# Patient Record
Sex: FEMALE | Race: BLACK OR AFRICAN AMERICAN | Employment: OTHER | URBAN - METROPOLITAN AREA
[De-identification: names, ages, dates, MRNs, and addresses within clinical notes are randomized per-mention and may not be internally consistent; named-entity substitution may affect disease eponyms.]

---

## 2018-09-16 ENCOUNTER — HOSPITAL ENCOUNTER (EMERGENCY)
Age: 73
Discharge: HOME | End: 2018-09-16
Attending: EMERGENCY MEDICINE
Payer: MEDICARE

## 2018-09-16 VITALS
OXYGEN SATURATION: 97 % | WEIGHT: 290 LBS | RESPIRATION RATE: 16 BRPM | HEIGHT: 64 IN | HEART RATE: 88 BPM | DIASTOLIC BLOOD PRESSURE: 91 MMHG | SYSTOLIC BLOOD PRESSURE: 159 MMHG | BODY MASS INDEX: 49.51 KG/M2 | TEMPERATURE: 98 F

## 2018-09-16 DIAGNOSIS — N89.8 VAGINAL LESION: ICD-10-CM

## 2018-09-16 DIAGNOSIS — K64.4 EXTERNAL HEMORRHOID: Primary | ICD-10-CM

## 2018-09-16 LAB
ERYTHROCYTE [DISTWIDTH] IN BLOOD BY AUTOMATED COUNT: 17.3 %
HCT VFR BLD AUTO: 36.5 % (ref 35.8–46.3)
HGB BLD-MCNC: 11.1 G/DL (ref 11.7–15.4)
MCH RBC QN AUTO: 26.1 PG (ref 26.1–32.9)
MCHC RBC AUTO-ENTMCNC: 30.4 G/DL (ref 31.4–35)
MCV RBC AUTO: 85.9 FL (ref 79.6–97.8)
NRBC # BLD: 0 K/UL (ref 0–0.2)
PLATELET # BLD AUTO: 323 K/UL (ref 150–450)
PMV BLD AUTO: 9.2 FL (ref 9.4–12.3)
RBC # BLD AUTO: 4.25 M/UL (ref 4.05–5.2)
WBC # BLD AUTO: 9 K/UL (ref 4.3–11.1)

## 2018-09-16 PROCEDURE — 77030011943

## 2018-09-16 PROCEDURE — 51701 INSERT BLADDER CATHETER: CPT | Performed by: EMERGENCY MEDICINE

## 2018-09-16 PROCEDURE — 99283 EMERGENCY DEPT VISIT LOW MDM: CPT | Performed by: EMERGENCY MEDICINE

## 2018-09-16 PROCEDURE — 81003 URINALYSIS AUTO W/O SCOPE: CPT | Performed by: EMERGENCY MEDICINE

## 2018-09-16 PROCEDURE — 85027 COMPLETE CBC AUTOMATED: CPT

## 2018-09-16 RX ORDER — DOCUSATE SODIUM 100 MG/1
100 CAPSULE, LIQUID FILLED ORAL 2 TIMES DAILY
Qty: 10 CAP | Refills: 0 | Status: SHIPPED | OUTPATIENT
Start: 2018-09-16 | End: 2018-09-21

## 2018-09-16 RX ORDER — HYDROCORTISONE 25 MG/G
CREAM TOPICAL 4 TIMES DAILY
Qty: 30 G | Refills: 0 | Status: SHIPPED | OUTPATIENT
Start: 2018-09-16

## 2018-09-16 NOTE — ED PROVIDER NOTES
HPI Comments: Blood noted on toilet paper when she wipes after urinating. Patient has not seen blood in her stool. She is unsure if it is urinary source or vaginal source. She has had a hysterectomy in the past.   Patient has not been sexually active for many many years. Patient is a 72 y.o. female presenting with vaginal bleeding. The history is provided by the patient. Vaginal Bleeding This is a new problem. The current episode started yesterday. Episode frequency: intermittent. The problem has not changed since onset. Pertinent negatives include no abdominal pain. Exacerbated by: with urination. Nothing relieves the symptoms. No past medical history on file. No past surgical history on file. No family history on file. Social History Social History  Marital status: SINGLE Spouse name: N/A  
 Number of children: N/A  
 Years of education: N/A Occupational History  Not on file. Social History Main Topics  Smoking status: Not on file  Smokeless tobacco: Not on file  Alcohol use Not on file  Drug use: Not on file  Sexual activity: Not on file Other Topics Concern  Not on file Social History Narrative ALLERGIES: Review of patient's allergies indicates no known allergies. Review of Systems Constitutional: Negative for fever. Gastrointestinal: Negative for abdominal pain, blood in stool, diarrhea, nausea and vomiting. Genitourinary: Positive for vaginal bleeding. Negative for dysuria, frequency, urgency and vaginal pain. Neurological: Negative for light-headedness. Vitals:  
 09/16/18 1318 BP: (!) 159/91 Pulse: 88 Resp: 16 Temp: 98 °F (36.7 °C) SpO2: 97% Weight: 131.5 kg (290 lb) Height: 5' 4\" (1.626 m) Physical Exam  
Constitutional: She is oriented to person, place, and time. She appears well-developed and well-nourished. No distress. Very pleasant, morbidly obese HENT:  
 Mouth/Throat: Oropharynx is clear and moist.  
Eyes: Conjunctivae are normal.  
Cardiovascular: Normal rate, regular rhythm and normal heart sounds. Pulmonary/Chest: Effort normal and breath sounds normal.  
Abdominal: Soft. She exhibits no distension. There is no tenderness. There is no rebound and no guarding. Genitourinary: Rectal exam shows guaiac negative stool. There is no lesion or injury on the right labia. There is no lesion or injury on the left labia. There is tenderness (there is an area in theleft anterolateral vaginal wall that appears hypertrophic and irritated. No active bleeding. Not completely consistent with an obvious polyp but does appear abnormal) in the vagina. No bleeding in the vagina. No vaginal discharge found. Musculoskeletal: Normal range of motion. She exhibits no edema. Neurological: She is alert and oriented to person, place, and time. Skin: Skin is warm and dry. Nursing note and vitals reviewed. MDM Number of Diagnoses or Management Options External hemorrhoid:  
Vaginal lesion:  
Diagnosis management comments: Straight catheter to obtain urine to see if the source is urine. Rectal exam to exclude GI bleeding. Possible speculum exam if blood seems to be coming from the vagina. Check hemoglobin. 3:02 PM 
Patient has a hemorrhoid that was tender and non-thrombosed but I did not see any bleeding. Rectal exam was heme-negative. Vaginal exam revealed a lesion in the anterolateral wall without active bleeding. Serial did not seem consistent with an obvious polyp but it does need to be followed up and rechecked. Urine was negative for blood as well. Likely blood from hemorrhoid versus the vaginal lesion. Patient be discharged home as long his hemoglobin is stable. Follow-up in [place] when she returns home for recheck vaginal speculum exam was recommended. Treat hemorrhoid. Amount and/or Complexity of Data Reviewed Clinical lab tests: ordered and reviewed (Results for orders placed or performed during the hospital encounter of 09/16/18 
-CBC W/O DIFF Result                                            Value                         Ref Range WBC                                               9.0                           4.3 - 11.1 K/uL           
     RBC                                               4.25                          4.05 - 5.2 M/uL HGB                                               11.1 (L)                      11.7 - 15.4 g/dL HCT                                               36.5                          35.8 - 46.3 % MCV                                               85.9                          79.6 - 97.8 FL            
     MCH                                               26.1                          26.1 - 32.9 PG            
     MCHC                                              30.4 (L)                      31.4 - 35.0 g/dL RDW                                               17.3                          % PLATELET                                          323                           150 - 450 K/uL MPV                                               9.2 (L)                       9.4 - 12.3 FL ABSOLUTE NRBC                                     0.00                          0.0 - 0.2 K/uL            
 
) 
 
 
 
 
ED Course Procedures

## 2018-09-16 NOTE — DISCHARGE INSTRUCTIONS

## 2018-09-16 NOTE — ED TRIAGE NOTES
Pt noticed a small amount of bleeding yesterday when she was wiping from using the bathroom. Pt stated it was the only time yesterday. Today it has been spotting.

## 2019-02-04 NOTE — ED NOTES
2/4/2019 INTENSIVIST PROGRESS NOTE:  
 
Patient seen and evaluated, chart reviewed 64 yo male presented with UGI bleed, hematemesis EGD per GI No acute events overnight Remained intubated post procedure Now pt in CCU intubated, sedated 2/4 on vent. D/w Dr. Luis Daniel Schumacher- not ready to liberate. Still critically ill and at high risk of sudden decline, worsening organ dysfunction ROS: unable to obtain Visit Vitals /72 Pulse (!) 126 Temp 98.6 °F (37 °C) Resp 24 Wt 116.1 kg (255 lb 15.3 oz) SpO2 100% BMI 40.09 kg/m² General: sedated, intubated on CPAP Eyes: anicteric HEENT: ETT in place Neck: FROM 
CV: RRR Lungs: clear Abd: soft : no flank pain Ext: no edema Skin: no rash Musculoskeletal: normal inspection Neuro: sedated CXR: clear Labs: 
 
Recent Labs 02/04/19 
6645 02/03/19 
0319 02/02/19 
2056 02/02/19 
1335 02/02/19 
7279 WBC 33.8* 32.1*  --  25.9* 17.1* HGB 7.8* 8.0* 8.5* 8.6* 6.2*  
 173  --  172 175 INR 1.7*  --   --   --  1.7* APTT  --   --   --   --  27.2 Recent Labs 02/04/19 
6957 02/03/19 
0319 02/02/19 
0601 02/02/19 
2257 02/02/19 
6317  145  --  143  --   
K 4.6 4.8  --  3.6  --   
* 113*  --  105  --   
CO2 21 23  --  29  --   
* 117*  --  148*  --   
BUN 48* 36*  --  28*  --   
CREA 1.87* 1.49*  --  1.32*  --   
CA 6.6* 7.1*  --  8.9  --   
LAC  --   --  5.4*  --  6.0* ALB 1.9* 2.0*  --  2.3*  --   
SGOT 1,737* 263*  --  83*  --   
* 144*  --  46  --   
LPSE  --   --   --  206  --   
 
Recent Labs 02/04/19 
0306 02/03/19 
0554 02/02/19 
1335 PH 7.37 7.50* 7.40 PCO2 36 29* 36  
PO2 31* 95 157* HCO3 20* 23 22 FIO2 40 50 50 No results for input(s): CPK, CKNDX, TROIQ in the last 72 hours. No lab exists for component: CPKMB No results found for: BNPP, BNP Labs reviewed CXR reviewed- ETT in good position, no obvious infiltrates or effusions Abdominal US reviewed I have reviewed discharge instructions with the patient. The patient verbalized understanding. Patient left ED via Discharge Method: ambulatory to Home with family. Opportunity for questions and clarification provided. Patient given 2 scripts. To continue your aftercare when you leave the hospital, you may receive an automated call from our care team to check in on how you are doing. This is a free service and part of our promise to provide the best care and service to meet your aftercare needs.  If you have questions, or wish to unsubscribe from this service please call [phone]. Thank you for Choosing our [facility] Emergency Department. A/P: 
- acute respiratory failure: vent support for airway protection; not ready for liberation 
- UGI bleed: transfuse as needed, IV protonix, IV octreotide 
- esophageal varices: IV octreotide 
- cirrhosis by ultrasound - LFTS climbing- will d/w with GI 
- WBC climbing 
- hypocalcemia - SYDNEY: worse - BP control - CPAP on vent 
- labs in AM 
- replete calcium 
- sedation  
- critically ill - Will assist on disposition planning when stable for transfer - cc time 35 minutes Manpreet Duncan MD